# Patient Record
Sex: FEMALE | Race: BLACK OR AFRICAN AMERICAN | NOT HISPANIC OR LATINO | Employment: FULL TIME | URBAN - METROPOLITAN AREA
[De-identification: names, ages, dates, MRNs, and addresses within clinical notes are randomized per-mention and may not be internally consistent; named-entity substitution may affect disease eponyms.]

---

## 2019-06-27 ENCOUNTER — OFFICE VISIT (OUTPATIENT)
Dept: OBSTETRICS AND GYNECOLOGY | Facility: CLINIC | Age: 39
End: 2019-06-27

## 2019-06-27 VITALS
WEIGHT: 132 LBS | BODY MASS INDEX: 23.39 KG/M2 | SYSTOLIC BLOOD PRESSURE: 104 MMHG | DIASTOLIC BLOOD PRESSURE: 60 MMHG | HEIGHT: 63 IN

## 2019-06-27 DIAGNOSIS — N76.2 ACUTE VULVITIS: ICD-10-CM

## 2019-06-27 DIAGNOSIS — N76.0 ACUTE VAGINITIS: Primary | ICD-10-CM

## 2019-06-27 PROBLEM — Z97.5 IUD (INTRAUTERINE DEVICE) IN PLACE: Status: ACTIVE | Noted: 2019-06-27

## 2019-06-27 PROCEDURE — 99202 OFFICE O/P NEW SF 15 MIN: CPT | Performed by: OBSTETRICS & GYNECOLOGY

## 2019-06-27 RX ORDER — FLUCONAZOLE 150 MG/1
150 TABLET ORAL DAILY
Qty: 1 TABLET | Refills: 0 | Status: SHIPPED | OUTPATIENT
Start: 2019-06-27 | End: 2021-08-13

## 2019-06-27 NOTE — PROGRESS NOTES
Subjective   Chief Complaint   Patient presents with   • Rash     red bumpy rash vag area     Yoko Lockwood is a 38 y.o. year old  presenting to be seen for evaluation of an abnormal vulvar itching.  Feels like a red bump and a red rash.  Been about 2 weeks.  She sitting seems to think it is like poison ivy.  She reports that she has been using an over-the-counter antifungal for about 2 weeks now.  Still has not completely cleared up.  Reports normal Pap test 2019 at Dr. Sosa's office.  She may have been same doctor the place the Mirena IUD.  She has not had a mammogram yet but no family history of breast cancer.    She is sexually active.  In the past 12 months there has not been new sexual partners.  Condoms are not typically used.  She would not like to be screened for STD's at today's exam.     Current birth control method: IUD - Mirena; prior to that she is used Depo-Provera birth control pills and has not had a period in about 15 years.    No LMP recorded. Patient has had an implant.  Cycle Frequency: absent                         The following portions of the patient's history were reviewed and updated as appropriate:She  has a past medical history of HSV-2 (herpes simplex virus 2) infection.  She does not have any pertinent problems on file.  She  has no past surgical history on file.  Her family history is not on file.  She  reports that she has been smoking.  She has been smoking about 0.25 packs per day. She has never used smokeless tobacco. She reports that she does not drink alcohol or use drugs.  She has No Known Allergies.    Current Outpatient Medications:   •  Aspirin-Acetaminophen-Caffeine (EXCEDRIN MIGRAINE PO), Take  by mouth., Disp: , Rfl:   •  levonorgestrel (MIRENA, 52 MG,) 20 MCG/24HR IUD, 1 each by Intrauterine route 1 (One) Time. Inserted 2017, Disp: , Rfl:   •  fluconazole (DIFLUCAN) 150 MG tablet, Take 1 tablet by mouth Daily. Take 1 tablet, Disp: 1 tablet, Rfl: 0  Review  "of Systems GYN questionnaire was negative x8.  Medical history negative x8.  System review positive for headaches and fatigue negative x10.  Social history she is single smokes 5 cigarettes/day (was 3 packs/day) no alcohol or illegal drugs.     Objective   /60   Ht 160 cm (63\")   Wt 59.9 kg (132 lb)   Breastfeeding? No   BMI 23.38 kg/m²     General:  well developed; well nourished  no acute distress  appears stated age   Skin:  No suspicious lesions seen   Abdomen: soft, non-tender; no masses  no umbilical or inguinal hernias are present  no hepato-splenomegaly   Pelvis: Clinical staff was present for exam  External genitalia:  normal appearance of the external genitalia including Bartholin's and [place]'s glands. There is slight amount of white discharge consistent with  :  urethral meatus normal;  Vaginal:  normal pink mucosa without prolapse or lesions.  Cervix:  normal appearance. IUD string present - 2.5 cms in length;  Uterus:  normal size, shape and consistency.  Adnexa:  normal bimanual exam of the adnexa.     Physical Exam    Lab Review   No data reviewed  Imaging   No data reviewed       Assessment   1. It appears that this is been a resolving yeast infection.  Acid-base balance is 4.5 which may be normal but there is very little if any discharge certainly there is nothing external which would be consistent with yeast.  I think that this has resolved with the over-the-counter antifungal.  2. She reports some dryness during intercourse and hot flashes or more so night sweats but no evidence of any postmenopausal vaginal dryness today with pH balance being normal.     Plan   1. Reassurance that this appears to be resolving I may call in the Diflucan to clear up any residual yeast x1.  2. Discussed calcium intake.  Multiple vitamins and iron very minimal but she needs milk yogurt green leafy vegetables.    No orders of the defined types were placed in this encounter.    New Medications Ordered This " Visit   Medications   • fluconazole (DIFLUCAN) 150 MG tablet     Sig: Take 1 tablet by mouth Daily. Take 1 tablet     Dispense:  1 tablet     Refill:  0         This note was electronically signed.    Kayden Jernigan MD  June 27, 2019

## 2021-08-13 ENCOUNTER — OFFICE VISIT (OUTPATIENT)
Dept: OBSTETRICS AND GYNECOLOGY | Facility: CLINIC | Age: 41
End: 2021-08-13

## 2021-08-13 VITALS
WEIGHT: 139 LBS | BODY MASS INDEX: 24.62 KG/M2 | SYSTOLIC BLOOD PRESSURE: 130 MMHG | RESPIRATION RATE: 16 BRPM | DIASTOLIC BLOOD PRESSURE: 80 MMHG

## 2021-08-13 DIAGNOSIS — Z30.431 IUD CHECK UP: ICD-10-CM

## 2021-08-13 DIAGNOSIS — Z01.419 ENCOUNTER FOR WELL WOMAN EXAM WITH ROUTINE GYNECOLOGICAL EXAM: Primary | ICD-10-CM

## 2021-08-13 DIAGNOSIS — Z12.31 ENCOUNTER FOR SCREENING MAMMOGRAM FOR MALIGNANT NEOPLASM OF BREAST: ICD-10-CM

## 2021-08-13 PROCEDURE — 99386 PREV VISIT NEW AGE 40-64: CPT | Performed by: NURSE PRACTITIONER

## 2021-08-13 NOTE — PROGRESS NOTES
Annual Visit     Patient Name: Yoko Lockwood  : 1980   MRN: [id number]   Care Team: Patient Care Team:  Provider, No Known as PCP - General    Chief Complaint:    Chief Complaint   Patient presents with   • Annual Exam       HPI: Yoko Lockwood is a 40 y.o. year old  presenting to be seen for her gynecologic exam.   Pap due today   Mirena placed 2017 - doing well with method   She has noticed some vaginal dryness with intercourse - lubricant is helpful   Smoker   Has never had a mammogram         Subjective      /80   Resp 16   Wt 63 kg (139 lb)   Breastfeeding No   BMI 24.62 kg/m²     BMI reviewed: Body mass index is 24.62 kg/m².      Objective     Physical Exam    Neuro: alert and oriented to person, place and time   General:  alert; cooperative; well developed; well nourished   Skin:  No suspicious lesions seen   Thyroid: normal to inspection and palpation   Lungs:  breathing is unlabored  clear to auscultation bilaterally   Heart:  regular rate and rhythm, S1, S2 normal, no murmur, click, rub or gallop  normal apical impulse   Breasts:  Examined in supine position  Symmetric without masses or skin dimpling  Nipples normal without inversion, lesions or discharge  There are no palpable axillary nodes  Fibrocystic changes are present both breasts without a discrete mass   Abdomen: soft, non-tender; no masses  no umbilical or inguinal hernias are present  no hepato-splenomegaly   Pelvis: Clinical staff was present for exam  External genitalia:  normal appearance of the external genitalia including Bartholin's and [place]'s glands.  :  urethral meatus normal;  Vaginal:  normal pink mucosa without prolapse or lesions. beginnings of vaginal atrophy noted   Cervix:  normal appearance. IUD string present - 2 cms in length;  Uterus:  normal size, shape and consistency.  Adnexa:  normal bimanual exam of the adnexa.  Rectal:  digital rectal exam not performed; anus visually normal appearing.          Assessment / Plan      Assessment  Problems Addressed This Visit    ICD-10-CM ICD-9-CM   1. Encounter for well woman exam with routine gynecological exam  Z01.419 V72.31   2. IUD check up  Z30.431 V25.42   3. Encounter for screening mammogram for malignant neoplasm of breast  Z12.31 V76.12       Plan    Pap smear pending   Discussed monthly SBEs and importance of annual screening mammogram - order placed   Discussed perimenopause and vaginal dryness - will cont with lubricant with intercourse, o/w asymptomatic   Will cont monitoring annually or she will call with problems   AV 1 yr             Follow Up  Return in about 1 year (around 8/13/2022) for Annual physical.  Patient was given instructions and counseling regarding her condition or for health maintenance advice. Please see specific information pulled into the AVS if appropriate.     Komal Maier, APRN  August 13, 2021  11:12 EDT

## 2022-09-09 ENCOUNTER — OFFICE VISIT (OUTPATIENT)
Dept: OBSTETRICS AND GYNECOLOGY | Facility: CLINIC | Age: 42
End: 2022-09-09

## 2022-09-09 VITALS
WEIGHT: 137 LBS | RESPIRATION RATE: 16 BRPM | SYSTOLIC BLOOD PRESSURE: 110 MMHG | BODY MASS INDEX: 24.27 KG/M2 | DIASTOLIC BLOOD PRESSURE: 70 MMHG

## 2022-09-09 DIAGNOSIS — R10.2 PELVIC PAIN: ICD-10-CM

## 2022-09-09 DIAGNOSIS — R10.819 SUPRAPUBIC TENDERNESS: ICD-10-CM

## 2022-09-09 DIAGNOSIS — Z01.411 ENCOUNTER FOR GYNECOLOGICAL EXAMINATION WITH ABNORMAL FINDING: Primary | ICD-10-CM

## 2022-09-09 DIAGNOSIS — Z30.431 IUD CHECK UP: ICD-10-CM

## 2022-09-09 DIAGNOSIS — Z12.31 ENCOUNTER FOR SCREENING MAMMOGRAM FOR MALIGNANT NEOPLASM OF BREAST: ICD-10-CM

## 2022-09-09 DIAGNOSIS — B00.9 HERPES SIMPLEX INFECTION: ICD-10-CM

## 2022-09-09 PROCEDURE — 87086 URINE CULTURE/COLONY COUNT: CPT | Performed by: NURSE PRACTITIONER

## 2022-09-09 PROCEDURE — 99396 PREV VISIT EST AGE 40-64: CPT | Performed by: NURSE PRACTITIONER

## 2022-09-09 RX ORDER — VALACYCLOVIR HYDROCHLORIDE 1 G/1
1000 TABLET, FILM COATED ORAL DAILY
Qty: 5 TABLET | Refills: 5 | Status: SHIPPED | OUTPATIENT
Start: 2022-09-09 | End: 2022-09-14

## 2022-09-09 NOTE — PROGRESS NOTES
Annual Visit     Patient Name: Yoko Lockwood  : 1980   MRN: [id number]   Care Team: Patient Care Team:  Provider, No Known as PCP - General    Chief Complaint:    Chief Complaint   Patient presents with   • Annual Exam       HPI: Yoko Lockwood is a 42 y.o. year old  presenting to be seen for her gynecologic exam.   Pap smear 2021 WNL and HPV negative     Mirena placed 2017 amenorrhea with method   Has noticed pelvic pain intermittently for the last yr   Occasional postcoital bldg but states vaginal dryness is bothersome and could be r/t that   Lubricant with intercourse is helpful    1 yr next month - in a monogamous relationship     Hx HSV - has approx 2 outbreaks a yr   Needs refill of valtrex     Mammogram ordered last yr but she didn't have it done     Smoker       Subjective      /70   Resp 16   Wt 62.1 kg (137 lb)   Breastfeeding No   BMI 24.27 kg/m²     BMI reviewed: Body mass index is 24.27 kg/m².      Objective     Physical Exam    Neuro: alert and oriented to person, place and time   General:  alert; cooperative; well developed; well nourished   Skin:  No suspicious lesions seen   Thyroid: normal to inspection and palpation   Lungs:  breathing is unlabored  clear to auscultation bilaterally   Heart:  regular rate and rhythm, S1, S2 normal, no murmur, click, rub or gallop  normal apical impulse   Breasts:  Examined in supine position  Symmetric without masses or skin dimpling  Nipples normal without inversion, lesions or discharge  There are no palpable axillary nodes  Fibrocystic changes are present both breasts without a discrete mass   Abdomen: soft, non-tender; no masses  no umbilical or inguinal hernias are present  no hepato-splenomegaly   Pelvis: Clinical staff was present for exam  External genitalia:  normal appearance of the external genitalia including Bartholin's and [place]'s glands.  :  urethral meatus normal;  Vaginal:  atrophic mucosal changes are  present; beginning of vaginal atrophy noted  Cervix:  normal appearance. IUD string present - 2 cms in length;  Uterus:  normal size, shape and consistency.  Adnexa:  normal bimanual exam of the adnexa.  Rectal:  digital rectal exam not performed; anus visually normal appearing.     Suprapubic tenderness present     Assessment / Plan      Assessment  Problems Addressed This Visit    ICD-10-CM ICD-9-CM   1. Encounter for gynecological examination with abnormal finding  Z01.411 V72.31   2. IUD check up  Z30.431 V25.42   3. Pelvic pain  R10.2 [id number]   4. Suprapubic tenderness  R10.819 789.69   5. Herpes simplex infection  B00.9 054.9   6. Encounter for screening mammogram for malignant neoplasm of breast  Z12.31 V76.12       Plan    Pap smear not indicated   Discussed monthly SBes and importance of annual imaging   Mammogram ordered     Discussed need for pelvic u/s to confirm IUD placement in uterus   Will call to discuss   On exam, suprapubic tenderness is present and she states that is sometimes where the sharp pain occurs   Check urine cx today   Discussed UTI prevention measures     HSV with infrequent outbreaks   Valtrex for episodic txment given   Discussed begin txment ASAP with onset of outbreak and no sexual activity during outbreak     AV 1 yr         40 to 64: Counseling/Anticipatory Guidance Discussed: contraception, screenings and self-breast exam    Follow Up  Return in about 1 year (around 9/9/2023) for Annual physical.  Patient was given instructions and counseling regarding her condition or for health maintenance advice. Please see specific information pulled into the AVS if appropriate.     Komal Maier, SACHIN  September 9, 2022  09:24 EDT

## 2022-09-10 LAB — BACTERIA SPEC AEROBE CULT: NORMAL
